# Patient Record
Sex: MALE | Race: WHITE
[De-identification: names, ages, dates, MRNs, and addresses within clinical notes are randomized per-mention and may not be internally consistent; named-entity substitution may affect disease eponyms.]

---

## 2023-06-29 ENCOUNTER — HOSPITAL ENCOUNTER (OUTPATIENT)
Dept: HOSPITAL 47 - LABPAT | Age: 63
Discharge: HOME | End: 2023-06-29
Attending: UROLOGY
Payer: COMMERCIAL

## 2023-06-29 DIAGNOSIS — C61: ICD-10-CM

## 2023-06-29 DIAGNOSIS — Z01.818: Primary | ICD-10-CM

## 2023-06-29 LAB
ANION GAP SERPL CALC-SCNC: 10 MMOL/L (ref 4–12)
BASOPHILS # BLD AUTO: 0.04 X 10*3/UL (ref 0–0.1)
BASOPHILS NFR BLD AUTO: 0.6 %
BUN SERPL-SCNC: 14.3 MG/DL (ref 9–27)
BUN/CREAT SERPL: 15.89 RATIO (ref 12–20)
CALCIUM SPEC-MCNC: 10 MG/DL (ref 8.7–10.3)
CHLORIDE SERPL-SCNC: 104 MMOL/L (ref 96–109)
CO2 SERPL-SCNC: 24 MMOL/L (ref 21.6–31.8)
EOSINOPHIL # BLD AUTO: 0.09 X 10*3/UL (ref 0.04–0.35)
EOSINOPHIL NFR BLD AUTO: 1.4 %
ERYTHROCYTE [DISTWIDTH] IN BLOOD BY AUTOMATED COUNT: 5.94 X 10*6/UL (ref 4.4–5.6)
ERYTHROCYTE [DISTWIDTH] IN BLOOD: 13.5 % (ref 11.5–14.5)
GLUCOSE SERPL-MCNC: 113 MG/DL (ref 70–110)
HCT VFR BLD AUTO: 53.2 % (ref 39.6–50)
HGB BLD-MCNC: 17.4 D/DL (ref 12–15)
IMM GRANULOCYTES BLD QL AUTO: 0.6 %
LYMPHOCYTES # SPEC AUTO: 1.57 X 10*3/UL (ref 0.9–5)
LYMPHOCYTES NFR SPEC AUTO: 24.2 %
MCH RBC QN AUTO: 29.3 PG (ref 27–32)
MCHC RBC AUTO-ENTMCNC: 32.7 D/DL (ref 32–37)
MCV RBC AUTO: 89.6 FL (ref 80–97)
MONOCYTES # BLD AUTO: 0.45 X 10*3/UL (ref 0.2–1)
MONOCYTES NFR BLD AUTO: 6.9 %
NEUTROPHILS # BLD AUTO: 4.29 X 10*3/UL (ref 1.8–7.7)
NEUTROPHILS NFR BLD AUTO: 66.3 %
NRBC BLD AUTO-RTO: 0 X 10*3/UL (ref 0–0.01)
PH UR: 6 [PH]
PLATELET # BLD AUTO: 254 X 10*3/UL (ref 140–440)
POTASSIUM SERPL-SCNC: 4.3 MMOL/L (ref 3.5–5.5)
SODIUM SERPL-SCNC: 138 MMOL/L (ref 135–145)
SP GR UR: 1.02 (ref 1–1.03)
UROBILINOGEN UR QL: 1
WBC # BLD AUTO: 6.48 X 10*3/UL (ref 4.5–10)

## 2023-06-29 PROCEDURE — 85025 COMPLETE CBC W/AUTO DIFF WBC: CPT

## 2023-06-29 PROCEDURE — 71046 X-RAY EXAM CHEST 2 VIEWS: CPT

## 2023-06-29 PROCEDURE — 80048 BASIC METABOLIC PNL TOTAL CA: CPT

## 2023-06-29 PROCEDURE — 87086 URINE CULTURE/COLONY COUNT: CPT

## 2023-06-29 PROCEDURE — 81001 URINALYSIS AUTO W/SCOPE: CPT

## 2023-06-29 NOTE — XR
EXAMINATION TYPE: XR chest 2V

 

DATE OF EXAM: 6/29/2023 10:31 AM

 

COMPARISON: Chest radiographs from 6/29/2023

 

TECHNIQUE: XR chest 2V Frontal and lateral views of the chest.

 

CLINICAL INDICATION:Male, 62 years old with history of C61; 

 

FINDINGS: 

Lungs/Pleura: Low lung volumes are present. There is no evidence of pleural effusion, focal consolida
tion, or pneumothorax.  

Pulmonary vascularity: Unremarkable.

Heart/mediastinum: Cardiomediastinal silhouette is unremarkable.

Musculoskeletal: No acute osseous pathology.

 

IMPRESSION: 

No acute cardiopulmonary disease/process.

## 2023-07-05 NOTE — P.HPIHPCON
History of Present Illness


H&P Date: 07/05/23


Chief Complaint: prostate cancer 





This is a 62-year-old male with history of [place] 7(4+3) prostate cancer.  

Option of a robotic radical prostatectomy versus radiation therapy was discussed

with him in detail.  He agreed to proceed with a robotic radical prostatectomy. 

Aware of the risk which includes but not limited to bleeding, infection, urinary

incontinence, erectile dysfunction, injury to nearby organs.  Risk of anesthesia

was also discussed with him.  Aware of risk of cancer recurrence and potential 

of needing additional treatments.  Discussed also the need a postoperative 

surveillance.  He understood all the risk and agreed to proceed


Consent for Procedure: 


I have explained the operation/procedure to the patient, including the risks, 

benefits, side effects, alternative therapies (including not receiving the 

proposed treatment or service), the likelihood of the patient achieving his/her 

goals, and potential recuperation problems for the procedure/sedation/analgesia,

as well as any blood products, if indicated. I also explained to the patient the

risks, benefits and side effects of the alternatives, as well as the risks 

related to not receiving the proposed procedure, care, treatment, or services.








Past Medical History


Past Medical History: Hypertension, Prostate Disorder, Thyroid Disorder


Additional Past Medical History / Comment(s): elevated psa , snores


History of Any Multi-Drug Resistant Organisms: None Reported


Past Surgical History: Cholecystectomy, Joint Replacement, Orthopedic Surgery


Additional Past Surgical History / Comment(s): colonoscopy, arthroscopic knee , 

total rt knee


Past Anesthesia/Blood Transfusion Reactions: No Reported Reaction


Additional Past Anesthesia/Blood Transfusion Reaction / Comment(s): no blood 

transfusions


Smoking Status: Never smoker





- Past Family History


  ** Father


Family Medical History: Cancer


Additional Family Medical History / Comment(s): rectal





Medications and Allergies


                                Home Medications











 Medication  Instructions  Recorded  Confirmed  Type


 


Ibuprofen [Motrin Ib] 600 mg PO AS DIRECTED PRN 06/28/23 06/28/23 History


 


Levothyroxine Sodium [Synthroid] 75 mcg PO DAILY 06/28/23 06/28/23 History


 


Losartan Potassium 50 mg PO DAILY 06/28/23 06/28/23 History








                                    Allergies











Allergy/AdvReac Type Severity Reaction Status Date / Time


 


Penicillins Allergy  Unknown Verified 06/28/23 14:23














Surgical - Exam





- General


no distress, no pain





- Eyes


normal ocular movement, no pale





- ENT


normal nares, normal mucosa





- Respiratory


normal expansion, normal respiratory effort





- Abdomen


Abdomen: soft, non tender





Assessment and Plan


Assessment: 





OR for robotic radical prostatectomy with pelvic lymph node dissection

## 2023-07-06 ENCOUNTER — HOSPITAL ENCOUNTER (OUTPATIENT)
Dept: HOSPITAL 47 - OR | Age: 63
LOS: 1 days | Discharge: HOME | End: 2023-07-07
Attending: UROLOGY
Payer: COMMERCIAL

## 2023-07-06 DIAGNOSIS — Z98.890: ICD-10-CM

## 2023-07-06 DIAGNOSIS — E07.9: ICD-10-CM

## 2023-07-06 DIAGNOSIS — C61: Primary | ICD-10-CM

## 2023-07-06 DIAGNOSIS — I10: ICD-10-CM

## 2023-07-06 DIAGNOSIS — G89.18: ICD-10-CM

## 2023-07-06 DIAGNOSIS — Z80.0: ICD-10-CM

## 2023-07-06 DIAGNOSIS — Z79.899: ICD-10-CM

## 2023-07-06 PROCEDURE — 64999 UNLISTED PX NERVOUS SYSTEM: CPT

## 2023-07-06 PROCEDURE — 55866 LAPS SURG PRST8ECT RPBIC RAD: CPT

## 2023-07-06 PROCEDURE — 38570 LAPAROSCOPY LYMPH NODE BIOP: CPT

## 2023-07-06 PROCEDURE — 86900 BLOOD TYPING SEROLOGIC ABO: CPT

## 2023-07-06 PROCEDURE — 86850 RBC ANTIBODY SCREEN: CPT

## 2023-07-06 PROCEDURE — 88307 TISSUE EXAM BY PATHOLOGIST: CPT

## 2023-07-06 PROCEDURE — 86901 BLOOD TYPING SEROLOGIC RH(D): CPT

## 2023-07-06 PROCEDURE — 88309 TISSUE EXAM BY PATHOLOGIST: CPT

## 2023-07-06 RX ADMIN — HEPARIN SODIUM SCH UNIT: 5000 INJECTION INTRAVENOUS; SUBCUTANEOUS at 17:14

## 2023-07-06 RX ADMIN — KETOROLAC TROMETHAMINE SCH MG: 15 INJECTION, SOLUTION INTRAMUSCULAR; INTRAVENOUS at 17:21

## 2023-07-06 RX ADMIN — DEXTROSE MONOHYDRATE, SODIUM CHLORIDE, AND POTASSIUM CHLORIDE SCH MLS/HR: 50; 4.5; 1.49 INJECTION, SOLUTION INTRAVENOUS at 14:42

## 2023-07-06 RX ADMIN — KETOROLAC TROMETHAMINE SCH MG: 15 INJECTION, SOLUTION INTRAMUSCULAR; INTRAVENOUS at 23:32

## 2023-07-06 RX ADMIN — DEXTROSE MONOHYDRATE, SODIUM CHLORIDE, AND POTASSIUM CHLORIDE SCH MLS/HR: 50; 4.5; 1.49 INJECTION, SOLUTION INTRAVENOUS at 21:59

## 2023-07-06 RX ADMIN — POTASSIUM CHLORIDE SCH MLS: 14.9 INJECTION, SOLUTION INTRAVENOUS at 06:43

## 2023-07-06 RX ADMIN — KETOROLAC TROMETHAMINE SCH: 15 INJECTION, SOLUTION INTRAMUSCULAR; INTRAVENOUS at 14:12

## 2023-07-06 RX ADMIN — HEPARIN SODIUM SCH UNIT: 5000 INJECTION INTRAVENOUS; SUBCUTANEOUS at 23:33

## 2023-07-06 NOTE — P.OP
Date of Procedure: 07/06/23


Preoperative Diagnosis: 


prostate  cancer


Postoperative Diagnosis: 


Same


Procedure(s) Performed: 


Robotic-assisted laparoscopic radical prostatectomy with pelvic lymph node 

dissection


Implants: 


none 


Anesthesia: NOEL


Surgeon: Adam Washington


Assistant #1: Michael Yanez


Estimated Blood Loss (ml): 150


Pathology: other (Prostate, bilateral seminal vesicle, bilateral pelvic lymph 

nodes)


Condition: stable


Disposition: PACU


Indications for Procedure: 





This is a 62-year-old male with history of [place] 7(4+3) prostate cancer.  

Option of a robotic radical prostatectomy versus radiation therapy was discussed

with him in detail.  He agreed to proceed with a robotic radical prostatectomy. 

Aware of the risk which includes but not limited to bleeding, infection, urinary

incontinence, erectile dysfunction, injury to nearby organs.  Risk of anesthesia

was also discussed with him.  Aware of risk of cancer recurrence and potential 

of needing additional treatments.  Discussed also the need a postoperative 

surveillance.  He understood all the risk and agreed to proceed


Description of Procedure: 


After preoperative antibiotics were started, the patient was taken to the 

operating room. Anesthesia was induced and the patient was placed in a supine 

position, with adequate padding of the pressure points, shoulders, back, legs 

and arms. He was then prepped and draped in the standard fashion. A critical 

pause was performed using two patient identifiers.





A 16F jacobsen catheter was placed to gravity drainage. A pneumo-peritoneum was 

created with placement of a Veress needle to 20 mm Hg without complication, and 

a 8 Fr trocar was placed above the umbillicus.  Under direct vision a 8mm 

robotic ports was placed lateral to each rectus slightly below the camera port. 

The left iliac fossa 8mm port was placed.  The right assistant right iliac fossa

12mm port and right paramedian 5mm portwere placed.  





After the patient was placed in the trendelenberg position, the robot was then 

docked to the 8mm robotic ports and then each robotic arm and tower was checked 

in relation to the patient's legs and hands to avoid inadvertent compression. 

The peritoneal cavity was inspected.





 An inverted U-shaped incision began laterally to the left medial umbilical 

ligament and extended high across the midline to the right umbilical ligament. 

The limbs of the "U" extended to the level of the vasa on both sides. We next 

developed the preperitoneal space and the space of Retzius. 





Cautery was used to dissected the bladder away from the prostate. After the 

anterior bladder neck was incised and the bladder entered the the posterior 

bladder neck was exposed and the ureteral orifces identified. The posterior 

bladder neck was then incised and dissected away from the prostate. The vas and 

the seminal vesicles were now exposed and dissected to their insertions into the

prostate and were not spared. The posterior layer of the Denonvillier's fascia 

was incised to enter kate the plane between prostate and perirectal fat. 





Each lateral pedicle was controlled with vessel sealer.  No nerve preservation 

was performed on the left, complete nerve preservation was performed on the 

right.  The puboprostatic ligament was incised where it inserted into the apex 

of the prostate and a plane between urethra and dorsal venous complex developed 

to expose the anterior urethral surface. The anterior wall of the urethra was 

transected with the cut setting a few millimeters distal to the apex of the 

prostate. The dorsal vein was ligated using 3-0 V lock 





 bilateral obturator and external  iliac lymph node packets were carefully 

dissected after careful visualization of the hypogastric artery and obturator 

nerve. There was careful attention paid to hemostasis with judicious use of 

cautery. 





The urethrovesical anastomosis was performed . the posterior denovillers was 

reapproximated using 3-0 V lock.   A 6 and 9inch 3-0 V-Lock suture was used to 

anastomose the urethra and bladder, starting at the 6:00 posterior position.  

Mucosa was secured in every stitch, to ensure a mucosa to mucosa anastomosis.  

The stitch was regularly cinched and the anastomosis tightened.  Care was taken 

to not violate the ureteral orifices.    The Jacobsen catheter was advanced, the 

bladder filled, and the anastomosis was tested, as described above.  Anastomsis 

was watertight at 150 mL





The periumbilical fascia was closed with 1-0-PDS suture in figure-of-eight 

fashion.  All ports were closed with a subcuticular 4-0 monocryl and Dermabond. 

Sponge, instrument, and needle counts were correct at the end of the case x2. 

All specimens including prostate and lymph nodes were sent to pathology for 

diagnosis and will be available in a week.





The patient tolerated the surgery well and without complication. He awoke 

without difficulty and was taken to the recovery room in stable condition

## 2023-07-07 VITALS
RESPIRATION RATE: 18 BRPM | HEART RATE: 60 BPM | SYSTOLIC BLOOD PRESSURE: 115 MMHG | TEMPERATURE: 98.4 F | DIASTOLIC BLOOD PRESSURE: 72 MMHG

## 2023-07-07 RX ADMIN — DEXTROSE MONOHYDRATE, SODIUM CHLORIDE, AND POTASSIUM CHLORIDE SCH MLS/HR: 50; 4.5; 1.49 INJECTION, SOLUTION INTRAVENOUS at 05:09

## 2023-07-07 RX ADMIN — POTASSIUM CHLORIDE SCH: 14.9 INJECTION, SOLUTION INTRAVENOUS at 06:18

## 2023-07-07 RX ADMIN — HEPARIN SODIUM SCH UNIT: 5000 INJECTION INTRAVENOUS; SUBCUTANEOUS at 07:57

## 2023-07-07 RX ADMIN — KETOROLAC TROMETHAMINE SCH MG: 15 INJECTION, SOLUTION INTRAMUSCULAR; INTRAVENOUS at 05:09

## 2023-07-07 NOTE — P.ANPRN
Procedure Note - Anesthesia





- Nerve Block Performed


  ** Bilateral Erector Spinae Single


Time Out Performed: Yes


Date of Procedure: 07/06/23


Procedure Start Time: 07:09


Procedure Stop Time: 07:15


Location of Patient: PreOp


Indication: Acute Post-Operative Pain, Requested by Surgeon


Sedation Type: Sedate with meaningful contact maintained


Preparation: Sterile Prep


Position: Supine


Needle Types: Pajunk


Needle Gauge: 21


Ultrasound used to visualize needle placement: Yes


Ultrasound used to observe medication spread: Yes


Blood Aspirated: No


Pain Paresthesia on Injection Noted: No


Resistance on Injection: Normal


Image Stored and Saved: Yes


Events: Uneventful and Well Tolerated (ropi .5% 15cc plus ns 10cc plus 

dexmethasone 4mg given bilaterlly at L1)

## 2023-07-07 NOTE — P.DS
Providers


Attending physician: 


Adam Washington MD





Primary care physician: 


[facility] Course: 





The patient underwent a robotic rad prostatectomy by Dr Washington 7/6/23.  His post

op course was uneventful  He will be d/cd home with a foly and fu with Dr washington

in 10 days for cath removal and path report





Plan - Discharge Summary


Discharge Rx Participant: No


New Discharge Prescriptions: 


No Action


   Losartan Potassium 50 mg PO DAILY


   Levothyroxine Sodium [Synthroid] 75 mcg PO DAILY


   Ibuprofen [Motrin Ib] 600 mg PO AS DIRECTED PRN


     PRN Reason: Pain


Discharge Medication List





Ibuprofen [Motrin Ib] 600 mg PO AS DIRECTED PRN 06/28/23 [History]


Levothyroxine Sodium [Synthroid] 75 mcg PO DAILY 06/28/23 [History]


Losartan Potassium 50 mg PO DAILY 06/28/23 [History]








Follow up Appointment(s)/Referral(s): 


Adam Washington MD [STAFF PHYSICIAN] - 10 Days (home with jacobsen  the patient 

should have an appaointment with dr washington for 10 days for cath removal)


Discharge Disposition: HOME SELF-CARE

## 2024-08-23 ENCOUNTER — HOSPITAL ENCOUNTER (EMERGENCY)
Dept: HOSPITAL 47 - EC | Age: 64
Discharge: HOME | End: 2024-08-23
Payer: COMMERCIAL

## 2024-08-23 PROCEDURE — 90715 TDAP VACCINE 7 YRS/> IM: CPT

## 2024-08-23 PROCEDURE — 90471 IMMUNIZATION ADMIN: CPT

## 2024-08-23 PROCEDURE — 12001 RPR S/N/AX/GEN/TRNK 2.5CM/<: CPT

## 2024-08-23 PROCEDURE — 99283 EMERGENCY DEPT VISIT LOW MDM: CPT

## 2024-09-30 NOTE — XR
Report 

  Patient:   Lui Gentile D   Ordering Physician:   Unknown, Unknown   

  ID:   [id number]   Phone, Pager:   Phone: N/A Pager: N/A   

  :   1960       

  Age/Gender:   63Y, M   Primary Location:   N/A   

  Procedure:   XR RIGHT THUMB COMPLETE   Study Date:   2024 8:51:34 PM   

  Accession #:   [id number]       

 

 

 

EXAMINATION TYPE: XR right thumb 3 views complete

 

DATE OF EXAM: 2024

 

COMPARISON: NONE

 

HISTORY: 63-year-old male thumb injury with wood splitter, pain

 

FINDINGS: There is a markedly comminuted fracture of the first distal phalanx. Multiple complex intra
-articular extensions into the DIP joint. There is displacement up to 5 mm and angulation as well. Ad
ditional mild-to-moderate degenerative change first CMC and first MCP joints.

 

IMPRESSION: Displaced, markedly comminuted, angulated intra-articular fracture of the first distal ph
alanx. Associated soft tissue injury.

## 2025-01-10 ENCOUNTER — HOSPITAL ENCOUNTER (OUTPATIENT)
Dept: HOSPITAL 47 - RADPETMAIN | Age: 65
Discharge: HOME | End: 2025-01-10
Attending: UROLOGY
Payer: COMMERCIAL

## 2025-01-10 DIAGNOSIS — C61: Primary | ICD-10-CM

## 2025-01-10 DIAGNOSIS — Z85.46: ICD-10-CM

## 2025-01-10 PROCEDURE — 78815 PET IMAGE W/CT SKULL-THIGH: CPT

## 2025-01-12 NOTE — PE
EXAMINATION TYPE: PET CT fusion skull to thigh

 

DATE OF EXAM: 1/10/2025

 

CLINICAL INDICATION:Male, 64 years old with history of C61 PROSTATE CANCER;

 

TECHNIQUE:  Following the intravenous administration of  6.17 mCi of Ga-68 Illuccix (PSMA), whole bod
y images are performed from the skull base to the midthigh.  Images are reviewed on the computer in t
he coronal, axial, and sagittal planes.  Reconstructed rotating images are created on independent wor
kstation and reviewed on the computer.   A non-contrast CT is performed in conjunction with the PET s
can. 

CT DLP: 12:15 mGycm, Automated exposure control for dose reduction was used.

 

COMPARISON: CT None, PET/CT None, MRI: None

 

FINDINGS: 

Mediastinal SUV mean is 2.9. Hepatic parenchyma SUV mean is 3.1

 

SKULL BASE AND NECK:  

No suspicious radiotracer activity.

 

CHEST, MEDIASTINUM, AND HILAR REGION: 

No suspicious radiotracer activity.

 

 

ABDOMEN AND PELVIS: 

No suspicious radiotracer activity.

 

 

MUSCULOSKELETAL STRUCTURES: 

No suspicious radiotracer activity.

 

OTHER CT: 

Atherosclerosis of the arterial stricture. Coronary artery atherosclerosis. Gallbladder surgically ab
sent. Scattered colonic diverticula. Right hip arthroplasty changes. Right renal cyst. 

 

IMPRESSION: 

No suspicious uptake or enlarged lymph nodes identified. PSA is rising consider short-term follow-up.


 

[facility], Workstation: [id number], 1/12/2025 7:05 PM